# Patient Record
Sex: MALE | Race: WHITE | NOT HISPANIC OR LATINO | Employment: OTHER | URBAN - NONMETROPOLITAN AREA
[De-identification: names, ages, dates, MRNs, and addresses within clinical notes are randomized per-mention and may not be internally consistent; named-entity substitution may affect disease eponyms.]

---

## 2022-09-02 ENCOUNTER — OFFICE VISIT (OUTPATIENT)
Dept: FAMILY MEDICINE | Facility: CLINIC | Age: 82
End: 2022-09-02
Payer: MEDICARE

## 2022-09-02 VITALS
TEMPERATURE: 99 F | OXYGEN SATURATION: 98 % | WEIGHT: 240 LBS | SYSTOLIC BLOOD PRESSURE: 104 MMHG | DIASTOLIC BLOOD PRESSURE: 62 MMHG | RESPIRATION RATE: 20 BRPM | HEIGHT: 72 IN | BODY MASS INDEX: 32.51 KG/M2 | HEART RATE: 72 BPM

## 2022-09-02 DIAGNOSIS — R52 BODY ACHES: ICD-10-CM

## 2022-09-02 DIAGNOSIS — R50.9 FEVER, UNSPECIFIED FEVER CAUSE: ICD-10-CM

## 2022-09-02 DIAGNOSIS — J01.10 ACUTE FRONTAL SINUSITIS, RECURRENCE NOT SPECIFIED: Primary | ICD-10-CM

## 2022-09-02 DIAGNOSIS — R53.83 FATIGUE, UNSPECIFIED TYPE: ICD-10-CM

## 2022-09-02 DIAGNOSIS — R05.9 COUGH: ICD-10-CM

## 2022-09-02 LAB
CTP QC/QA: YES
FLUAV AG NPH QL: NEGATIVE
FLUBV AG NPH QL: NEGATIVE
SARS-COV-2 AG RESP QL IA.RAPID: NEGATIVE

## 2022-09-02 PROCEDURE — 99213 PR OFFICE/OUTPT VISIT, EST, LEVL III, 20-29 MIN: ICD-10-PCS | Mod: ,,, | Performed by: NURSE PRACTITIONER

## 2022-09-02 PROCEDURE — 87428 SARSCOV & INF VIR A&B AG IA: CPT | Mod: RHCUB | Performed by: NURSE PRACTITIONER

## 2022-09-02 PROCEDURE — 96372 PR INJECTION,THERAP/PROPH/DIAG2ST, IM OR SUBCUT: ICD-10-PCS | Mod: ,,, | Performed by: NURSE PRACTITIONER

## 2022-09-02 PROCEDURE — 99213 OFFICE O/P EST LOW 20 MIN: CPT | Mod: ,,, | Performed by: NURSE PRACTITIONER

## 2022-09-02 PROCEDURE — 96372 THER/PROPH/DIAG INJ SC/IM: CPT | Mod: ,,, | Performed by: NURSE PRACTITIONER

## 2022-09-02 RX ORDER — ALOGLIPTIN 25 MG/1
TABLET, FILM COATED ORAL
COMMUNITY
Start: 2022-07-28

## 2022-09-02 RX ORDER — AZITHROMYCIN 250 MG/1
TABLET, FILM COATED ORAL
Qty: 6 TABLET | Refills: 0 | Status: SHIPPED | OUTPATIENT
Start: 2022-09-02 | End: 2022-09-07

## 2022-09-02 RX ORDER — DEXAMETHASONE SODIUM PHOSPHATE 4 MG/ML
4 INJECTION, SOLUTION INTRA-ARTICULAR; INTRALESIONAL; INTRAMUSCULAR; INTRAVENOUS; SOFT TISSUE
Status: COMPLETED | OUTPATIENT
Start: 2022-09-02 | End: 2022-09-02

## 2022-09-02 RX ORDER — HYDROXYZINE HYDROCHLORIDE 10 MG/1
2 TABLET, FILM COATED ORAL NIGHTLY
COMMUNITY
Start: 2022-01-31

## 2022-09-02 RX ORDER — GLIPIZIDE 10 MG/1
10 TABLET ORAL
COMMUNITY
Start: 2022-01-31

## 2022-09-02 RX ORDER — CEFTRIAXONE 1 G/1
1 INJECTION, POWDER, FOR SOLUTION INTRAMUSCULAR; INTRAVENOUS
Status: COMPLETED | OUTPATIENT
Start: 2022-09-02 | End: 2022-09-02

## 2022-09-02 RX ORDER — TAMSULOSIN HYDROCHLORIDE 0.4 MG/1
0.4 CAPSULE ORAL
COMMUNITY
Start: 2022-05-09

## 2022-09-02 RX ORDER — DICYCLOMINE HYDROCHLORIDE 20 MG/1
1 TABLET ORAL
COMMUNITY

## 2022-09-02 RX ORDER — TRAZODONE HYDROCHLORIDE 50 MG/1
1 TABLET ORAL NIGHTLY
COMMUNITY
Start: 2022-08-24

## 2022-09-02 RX ORDER — METFORMIN HYDROCHLORIDE 1000 MG/1
1000 TABLET, FILM COATED, EXTENDED RELEASE ORAL
COMMUNITY

## 2022-09-02 RX ORDER — GUAIFENESIN 600 MG/1
1200 TABLET, EXTENDED RELEASE ORAL 2 TIMES DAILY
Qty: 40 TABLET | Refills: 0 | Status: SHIPPED | OUTPATIENT
Start: 2022-09-02 | End: 2022-09-12

## 2022-09-02 RX ORDER — SERTRALINE HYDROCHLORIDE 100 MG/1
50 TABLET, FILM COATED ORAL
COMMUNITY
Start: 2022-08-12

## 2022-09-02 RX ORDER — SIMVASTATIN 40 MG/1
40 TABLET, FILM COATED ORAL NIGHTLY
COMMUNITY

## 2022-09-02 RX ORDER — MIRTAZAPINE 30 MG/1
TABLET, FILM COATED ORAL
COMMUNITY

## 2022-09-02 RX ORDER — SIMVASTATIN 40 MG/1
TABLET, FILM COATED ORAL
COMMUNITY

## 2022-09-02 RX ORDER — FLUTICASONE PROPIONATE 50 MCG
SPRAY, SUSPENSION (ML) NASAL
COMMUNITY
Start: 2022-01-31 | End: 2022-09-02

## 2022-09-02 RX ORDER — FLUTICASONE PROPIONATE 50 MCG
1 SPRAY, SUSPENSION (ML) NASAL DAILY
Qty: 18 ML | Refills: 0 | Status: SHIPPED | OUTPATIENT
Start: 2022-09-02

## 2022-09-02 RX ORDER — TERAZOSIN 5 MG/1
5 CAPSULE ORAL
COMMUNITY
Start: 2022-01-31

## 2022-09-02 RX ORDER — FINASTERIDE 5 MG/1
5 TABLET, FILM COATED ORAL
COMMUNITY
Start: 2022-07-28

## 2022-09-02 RX ORDER — IBUPROFEN 800 MG/1
800 TABLET ORAL
COMMUNITY
Start: 2022-07-28

## 2022-09-02 RX ORDER — TERAZOSIN 2 MG/1
CAPSULE ORAL
COMMUNITY

## 2022-09-02 RX ADMIN — DEXAMETHASONE SODIUM PHOSPHATE 4 MG: 4 INJECTION, SOLUTION INTRA-ARTICULAR; INTRALESIONAL; INTRAMUSCULAR; INTRAVENOUS; SOFT TISSUE at 10:09

## 2022-09-02 RX ADMIN — CEFTRIAXONE 1 G: 1 INJECTION, POWDER, FOR SOLUTION INTRAMUSCULAR; INTRAVENOUS at 10:09

## 2022-09-02 NOTE — PROGRESS NOTES
[facility] - [facility]  Phone: [phone]       PATIENT NAME: Paresh Jackson   : 1940    AGE: 82 y.o. DATE: 2022    MRN: [id number]        Reason for Visit / Chief Complaint:  Cough, Nasal Congestion, Fatigue, Sore Throat, and Generalized Body Aches (Symptoms start last . Complain of productive cough clear sputum,  burning in his esophageal area, nasal congestion, fatiuge, sore throat, and body aches.)     Subjective:     HPI:  82 yr old male presents to the office for cough, congestion, sore throat, and body aches since      Review of Systems:    Pertinent items are above noted in HPI.    Review of patient's allergies indicates:   Allergen Reactions    Gabapentin Hives, Rash and Swelling        Med List:  Current Outpatient Medications on File Prior to Visit   Medication Sig Dispense Refill    alogliptin (NESINA) 25 mg Tab TAKE 1 TABLET BY MOUTH DAILY FOR DIABETES REPLACES SAXAGLIPTIN      blood sugar diagnostic Strp USE 1 STRIP FOR TESTING BLOOD GLUCOSE EVERY 3 DAYS      dicyclomine (BENTYL) 20 mg tablet 1 tablet.      finasteride (PROSCAR) 5 mg tablet 5 mg.      glipiZIDE (GLUCOTROL) 10 MG tablet 10 mg.      hydrOXYzine HCL (ATARAX) 10 MG Tab Take 2 tablets by mouth every evening.      ibuprofen (ADVIL,MOTRIN) 800 MG tablet 800 mg.      metFORMIN (GLUMETZA) 1000 MG (MOD) 24hr tablet Take 1,000 mg by mouth.      mirtazapine (REMERON) 30 MG tablet 1 tablet at bedtime      multivit with minerals/lutein (MULTIVITAMIN 50 PLUS ORAL)       sertraline (ZOLOFT) 100 MG tablet 50 mg.      simvastatin (ZOCOR) 40 MG tablet Take 40 mg by mouth every evening.      tamsulosin (FLOMAX) 0.4 mg Cap 0.4 mg.      terazosin (HYTRIN) 5 MG capsule 5 mg.      traZODone (DESYREL) 50 MG tablet Take 1 tablet by mouth every evening.      [DISCONTINUED] fluticasone propionate (FLONASE) 50 mcg/actuation nasal spray by Nasal route.      simvastatin (ZOCOR) 40 MG tablet        terazosin (HYTRIN) 2 MG capsule        No current facility-administered medications on file prior to visit.       Medical/Social/Family History:  Past Medical History:   Diagnosis Date    Cancer     Depression     Diabetes mellitus, type 2     Enlarged prostate     Hyperlipidemia       Social History     Tobacco Use   Smoking Status Never   Smokeless Tobacco Never      Social History     Substance and Sexual Activity   Alcohol Use Yes    Alcohol/week: 6.0 standard drinks    Types: 6 Shots of liquor per week       History reviewed. No pertinent family history.   Past Surgical History:   Procedure Laterality Date    COLONOSCOPY          Objective:      Vitals:    09/02/22 0909   BP: 104/62   Pulse: 72   Resp: 20   Temp: 98.8 °F (37.1 °C)   TempSrc: Oral   SpO2: 98%   Weight: 108.9 kg (240 lb)   Height: 6' (1.829 m)     Body mass index is 32.55 kg/m².     Physical Exam:    General: well developed, well nourished    Head: normocephalic, atraumatic    Eyes: conjunctivae/corneas clear. PERRL.    Mouth/ENT: postnasal drip notedmucous membranes moist, pharynx normal without lesions Uvula is midline.     Neck: supple, symmetrical, trachea midline, no adenopathy, and thyroid: not enlarged, symmetric, no tenderness/mass/nodules    Respiratory: unlabored respirations, no intercostal retractions or accessory muscle use, clear to auscultation without rales or wheezes    Cardiovascular: normal rate and regular rhythm, S1 and S2 normal, no murmurs noted;    Abdomen: soft, nontender    Musculoskeletal: negative; full range of motion, no tenderness, palpable spasm or pain on motion    Lymphadenopathy: No cervical or supraclavicular adenopathy    Neurological: normal without focal findings and mental status, speech normal, alert and oriented x3    Skin: Skin color, texture, turgor normal. No rashes or lesions    Psych: no auditory or visual hallucinations, no anxiety, no depression/worrying alot       Assessment:          ICD-10-CM  ICD-9-CM   1. Acute frontal sinusitis, recurrence not specified  J01.10 461.1   2. Cough  R05.9 786.2   3. Fatigue, unspecified type  R53.83 780.79   4. Body aches  R52 780.96   5. Fever, unspecified fever cause  R50.9 780.60        Plan:       Acute frontal sinusitis, recurrence not specified  -     cefTRIAXone injection 1 g  -     dexamethasone injection 4 mg  -     azithromycin (Z-BALBIR) 250 MG tablet; Take 2 tablets by mouth on day 1; Take 1 tablet by mouth on days 2-5  Dispense: 6 tablet; Refill: 0  -     guaiFENesin (MUCINEX) 600 mg 12 hr tablet; Take 2 tablets (1,200 mg total) by mouth 2 (two) times daily. for 10 days  Dispense: 40 tablet; Refill: 0  -     fluticasone propionate (FLONASE) 50 mcg/actuation nasal spray; 1 spray (50 mcg total) by Each Nostril route once daily.  Dispense: 18 mL; Refill: 0    Cough  -     POCT SARS-COV2 (COVID) with Flu Antigen    Fatigue, unspecified type  -     POCT SARS-COV2 (COVID) with Flu Antigen    Body aches  -     POCT SARS-COV2 (COVID) with Flu Antigen    Fever, unspecified fever cause  -     POCT SARS-COV2 (COVID) with Flu Antigen      Current Outpatient Medications:     alogliptin (NESINA) 25 mg Tab, TAKE 1 TABLET BY MOUTH DAILY FOR DIABETES REPLACES SAXAGLIPTIN, Disp: , Rfl:     blood sugar diagnostic Strp, USE 1 STRIP FOR TESTING BLOOD GLUCOSE EVERY 3 DAYS, Disp: , Rfl:     dicyclomine (BENTYL) 20 mg tablet, 1 tablet., Disp: , Rfl:     finasteride (PROSCAR) 5 mg tablet, 5 mg., Disp: , Rfl:     glipiZIDE (GLUCOTROL) 10 MG tablet, 10 mg., Disp: , Rfl:     hydrOXYzine HCL (ATARAX) 10 MG Tab, Take 2 tablets by mouth every evening., Disp: , Rfl:     ibuprofen (ADVIL,MOTRIN) 800 MG tablet, 800 mg., Disp: , Rfl:     metFORMIN (GLUMETZA) 1000 MG (MOD) 24hr tablet, Take 1,000 mg by mouth., Disp: , Rfl:     mirtazapine (REMERON) 30 MG tablet, 1 tablet at bedtime, Disp: , Rfl:     multivit with minerals/lutein (MULTIVITAMIN 50 PLUS ORAL), , Disp: , Rfl:     sertraline (ZOLOFT)  100 MG tablet, 50 mg., Disp: , Rfl:     simvastatin (ZOCOR) 40 MG tablet, Take 40 mg by mouth every evening., Disp: , Rfl:     tamsulosin (FLOMAX) 0.4 mg Cap, 0.4 mg., Disp: , Rfl:     terazosin (HYTRIN) 5 MG capsule, 5 mg., Disp: , Rfl:     traZODone (DESYREL) 50 MG tablet, Take 1 tablet by mouth every evening., Disp: , Rfl:     azithromycin (Z-BALBIR) 250 MG tablet, Take 2 tablets by mouth on day 1; Take 1 tablet by mouth on days 2-5, Disp: 6 tablet, Rfl: 0    fluticasone propionate (FLONASE) 50 mcg/actuation nasal spray, 1 spray (50 mcg total) by Each Nostril route once daily., Disp: 18 mL, Rfl: 0    guaiFENesin (MUCINEX) 600 mg 12 hr tablet, Take 2 tablets (1,200 mg total) by mouth 2 (two) times daily. for 10 days, Disp: 40 tablet, Rfl: 0    simvastatin (ZOCOR) 40 MG tablet, , Disp: , Rfl:     terazosin (HYTRIN) 2 MG capsule, , Disp: , Rfl:     Current Facility-Administered Medications:     cefTRIAXone injection 1 g, 1 g, Intramuscular, 1 time in Clinic/HOD, RAMYA Mercedes    dexamethasone injection 4 mg, 4 mg, Intramuscular, 1 time in Clinic/HOD, RAMYA Mercedes      New & refilled meds:  Requested Prescriptions     Signed Prescriptions Disp Refills    azithromycin (Z-BALBIR) 250 MG tablet 6 tablet 0     Sig: Take 2 tablets by mouth on day 1; Take 1 tablet by mouth on days 2-5    guaiFENesin (MUCINEX) 600 mg 12 hr tablet 40 tablet 0     Sig: Take 2 tablets (1,200 mg total) by mouth 2 (two) times daily. for 10 days    fluticasone propionate (FLONASE) 50 mcg/actuation nasal spray 18 mL 0     Si spray (50 mcg total) by Each Nostril route once daily.     Treatment Recommendations:    Orders and follow up as documented in patient record.    Return to clinic as needed.    Signature: JUAN CARLOS Terrell    Agree with plan